# Patient Record
Sex: FEMALE | Race: BLACK OR AFRICAN AMERICAN | Employment: FULL TIME | URBAN - METROPOLITAN AREA
[De-identification: names, ages, dates, MRNs, and addresses within clinical notes are randomized per-mention and may not be internally consistent; named-entity substitution may affect disease eponyms.]

---

## 2017-01-13 RX ORDER — ANASTROZOLE 1 MG/1
TABLET ORAL
Qty: 90 TAB | Refills: 0 | Status: SHIPPED | OUTPATIENT
Start: 2017-01-13 | End: 2017-03-24 | Stop reason: SDUPTHER

## 2017-05-31 ENCOUNTER — OFFICE VISIT (OUTPATIENT)
Dept: ONCOLOGY | Age: 57
End: 2017-05-31

## 2017-05-31 VITALS
HEIGHT: 65 IN | HEART RATE: 56 BPM | OXYGEN SATURATION: 99 % | RESPIRATION RATE: 20 BRPM | BODY MASS INDEX: 30.49 KG/M2 | DIASTOLIC BLOOD PRESSURE: 86 MMHG | TEMPERATURE: 96.8 F | WEIGHT: 183 LBS | SYSTOLIC BLOOD PRESSURE: 140 MMHG

## 2017-05-31 DIAGNOSIS — C50.911 MALIGNANT NEOPLASM OF RIGHT FEMALE BREAST, UNSPECIFIED SITE OF BREAST: Primary | ICD-10-CM

## 2017-05-31 NOTE — PROGRESS NOTES
[address], [address] [facility] [[email or web address]]  David Viveros 19  W: [phone]  F: [phone]     f/u HEME/ONC CONSULT    Reason for visit: evaluation for treatment for   Breast Cancer    Consulting physician:  Dr. Say South    HPI:   Luiza Martínez is a 64 y.o.  female who I was asked to see in consultation at the request of Dr. Ness Lindsey for evaluation for systemic therapy for breast cancer. She had an abnormal mammogram (last mammogram prior to this year was in 2008) that led to a right breast biopsy on 7/29/14 showing ADH. Right lumpectomy on 8/20/14 showed 1 cm of IDC, gr 2, ER + at 100%, GA negative, HER 2 negative (ratio 1.2, sig/cell 3.2), ki67 10%, DCIS present without extensive intraductal component, 1.2 cm, ER + at 100%, GA negative. oncotype RS = 21, intermediate risk. ER + at 9.2; GA negative at 5.3; HER 2 negative at 8.7. SLN bx and ALND on 10/6/14 showed 1/12 nodes involved, 0.4 cm, no CHRIS. Extensive FH of breast cancer, MGM at age 67, mother diagnosed at age 72, 2 maternal aunts diagnosed with breast cancer at ages 67 and 76. BRCA 1/2/BARTnegative. Finished XRT 12/12/14    Started anastrozole 12/19/14    Interval history: In today for follow up. Complains of gr 2 constipation, gr 1 insomnia, gr 1 vaginal dryness. She continues anastrozole, tolerating well. DX   Encounter Diagnosis   Name Primary?     Malignant neoplasm of right female breast, unspecified site of breast ([address] Yes          Past Medical History:   Diagnosis Date    Anemia 10 yrs ago    Breast cancer ([address]     2014    Cancer ([address] 7/30/2014    Dental disorder periodontal disease    Radiation therapy complication     idc- pt opted no chemo 1+lymph node     Past Surgical History:   Procedure Laterality Date    BREAST SURGERY PROCEDURE UNLISTED Right     RIGHT sentinel node biopsy    HX BREAST BIOPSY Right 8/20/2014    RIGHT lumpectomy    HX BREAST LUMPECTOMY Right     2014-idc    HX  SECTION  1997    HX GI  2010     Social History     Social History    Marital status: SINGLE     Spouse name: N/A    Number of children: N/A    Years of education: N/A     Social History Main Topics    Smoking status: Never Smoker    Smokeless tobacco: Never Used    Alcohol use No    Drug use: No    Sexual activity: Yes     Partners: Male     Birth control/ protection: Condom     Other Topics Concern    None     Social History Narrative     Family History   Problem Relation Age of Onset    Lupus Sister     Hypertension Sister     Cancer Mother 72     breast    Stroke Mother     Breast Cancer Mother     Cancer Maternal Aunt 67     breast    Cancer Maternal Grandmother 72     breast    Cancer Maternal Aunt 75     breast       Current Outpatient Prescriptions   Medication Sig Dispense Refill    anastrozole (ARIMIDEX) 1 mg tablet TAKE 1 TABLET BY MOUTH EVERY DAY 90 Tab 3    cholecalciferol, vitamin D3, 400 unit/5 mL liqd Take 3 Drops by mouth daily. 2000 units/day         No Known Allergies    Review of Systems    A comprehensive review of systems was performed and all systems were negative except for HPI and for the the symptom report form, reviewed and scanned in.    Objective:  Physical Exam:  Visit Vitals    /86 (BP 1 Location: Right arm, BP Patient Position: Sitting)    Pulse (!) 56    Temp 96.8 °F (36 °C) (Temporal)    Resp 20    Ht 5' 5\" (1.651 m)    Wt 183 lb (83 kg)    SpO2 99%    BMI 30.45 kg/m2       General:  Alert, cooperative, no distress, appears stated age. Head:  Normocephalic, without obvious abnormality, atraumatic. Eyes:  Conjunctivae/corneas clear. PERRL, EOMs intact. Throat: Lips, mucosa, and tongue normal.    Neck: Supple, symmetrical, trachea midline, no adenopathy, thyroid: no enlargement/tenderness/nodules   Back:   Symmetric, no curvature. ROM normal. No CVA tenderness. Lungs:   Clear to auscultation bilaterally.    Chest wall:  No tenderness or deformity. Heart:  Regular rate and rhythm, S1, S2 normal, no murmur, click, rub or gallop. Abdomen:   Soft, non-tender. Bowel sounds normal. No masses,  No organomegaly. Extremities: Extremities normal, atraumatic, no cyanosis or edema. Skin: Skin color, texture, turgor normal. No rashes or lesions. Lymph nodes: Cervical, supraclavicular, and axillary nodes normal. Palpable cord in right axilla   Neurologic: CNII-XII intact. Diagnostic Imaging   No results found for this or any previous visit. 8/29/14 breast MRI  Right breast: A postoperative cavity appears in the right upper outer   quadrant anteriorly, containing a biopsy clip in its posterior lateral wall. The cavity itself measures 3.2 x 2.4 x 3.8 cm. There is no abnormal   associated skin or nipple thickening and no additional enhancement or   morphologic abnormality in the right breast concerning for malignancy. There   are benign appearing nonenhancing masses suggesting fibroadenomas and small   scattered foci showing delayed enhancement, most of which suggest   intramammary lymph nodes. There is no lymphadenopathy. Left breast: Scattered foci of delayed enhancement. No morphologic or   enhancement abnormality to suggest malignancy. No lymphadenopathy. IMPRESSION:   1. Right BI-RADS 6, known malignancy. Left BI-RADS 2, benign. 2. RIGHT BREAST: Postoperative changes status post surgical resection of   known malignancy. There is no MRI evidence for residual neoplasm in the   breast.   3. LEFT BREAST: No MRI sign of malignancy.     12/11/14 dexa  Findings:  Lumbar spine: L1-L4, excluding L2  Bone mineral density (gm/cm2): 1.170  % of peak bone mass: 112  % for age matched controls: 125  T score: 1.1  Z score: 2.2  Hip: Left total hip  Bone mineral density (gm/cm2): 1.090  % of peak bone mass: 116  % for age matched controls: 80  T score: 1.2  Z score: 1.9  Hip: Right total hip  Bone mineral density (gm/cm2): 1.052  % of peak bone mass: 112  % for age matched controls: 80  T score: 0.9  Z score: 1.5  IMPRESSION:  This patient is normal using the World Health Organization criteria  10 year probability of major osteoporotic fracture: 3.3%  10 year probability of hip fracture: 0%    4/23/15 UE Doppler  FINDINGS:  Right: The internal jugular, subclavian, axillary, brachial,  basilic, and cephalic veins are visualized; each is compressible and no narrowing of the flow channel on color Doppler imaging is observed. Phasic/pulsatile flow is observed in the subclavian vein. Left: The subclavian vein is patent on color Doppler imaging and phasic/pulsatile flow is observed. This side was not otherwise evaluated. IMPRESSION: No evidence of right upper extremity vein thrombosis. 8/13/15 Bilat Mammogram  FINDINGS: Bilateral digital diagnostic mammography was performed, and is interpreted in conjunction with a computer assisted detection (CAD) system. There are postoperative changes within the right breast. There is new diffuse right breast trabecular and skin thickening, likely reflecting radiation therapy. No dominant mass is visualized. No suspicious cluster of microcalcification is seen. Left breast parenchyma is stable.    1/21/16 bilat mammo  IMPRESSION:  1. Right BI-RADS 2, benign. LEFT BI-RADS 4, suspicious. 2. RIGHT BREAST: Postoperative changes in the upper outer quadrant    accompanied by new post radiation changes but no MRI sign of malignancy.    Followup diagnostic mammography is recommended in August 2016, followup    surveillance MRI January 2017. 3. LEFT BREAST: Clumped linear enhancement at 11:30, essentially new since    the prior study, concerning for possible DCIS. Diagnostic mammography with    breast MRI radiologist is recommended, with magnification views at 11:30 ,    and sonographic correlation if appropriate for further characterization and    possible biopsy localization.  If this is unsuccessful, MRI localization    could be attempted. 4. A summary portfolio has been created for reference and is available in    PACS. 3/10/16 MRI biopsy Left breast-benign      Lab Results  Lab Results   Component Value Date/Time    WBC 4.5 08/13/2014 02:52 PM    HGB 12.0 08/13/2014 02:52 PM    HCT 36.4 08/13/2014 02:52 PM    PLATELET 220 26/97/1527 02:52 PM    MCV 81.8 08/13/2014 02:52 PM     Lab Results   Component Value Date/Time    Sodium 141 08/13/2014 02:52 PM    Potassium 4.0 08/13/2014 02:52 PM    Chloride 101 08/13/2014 02:52 PM    CO2 30 08/13/2014 02:52 PM    Anion gap 10 08/13/2014 02:52 PM    Glucose 106 08/13/2014 02:52 PM    BUN 20 08/13/2014 02:52 PM    Creatinine 0.79 08/13/2014 02:52 PM    BUN/Creatinine ratio 25 08/13/2014 02:52 PM    GFR est AA >60 08/13/2014 02:52 PM    GFR est non-AA >60 08/13/2014 02:52 PM    Calcium 9.1 08/13/2014 02:52 PM     Assessment/Plan:  64 y.o. female with [id number], right IDC, gr 2, ER +, ID negative, HER 2 negative. Postmenopausal, LMP 9/2012. Intermediate oncotype. PS 0    1. Breast cancer stage: IIA    Hormonal therapy: administerd    With no further therapy, her RR is 36%, treatment with endocrine therapy will decrease her RR to 18%, the addition of chemo will decrease her RR to 10-12%. This is close to the RR given by the intermediate oncotype, though with that the exact benefit of chemo is an unknown. It was my recommendation due to her + LN and a NOT low oncotype, for her to receive chemo followed by XRT and endocrine therapy. Patient has declined chemotherapy. No evidence of recurrence, continue anastrozole. Mammogram on 1/21/16 showed suspicious lesion in left breast. MRI biopsy on 3/10/16 was benign. Repeat 3D mammogram in Oct 2016 was benign, next due 10/17, ordered. Baseline DEXA in 2014 normal. Repeat DEXA ordered, not yet scheduled. 2. Right breast lymphedema: resolved.  Lowered sodium intake    > 15 min were spent with this patient with > 50% of that time spent in face to face counseling      There are no Patient Instructions on file for this visit. Follow-up Disposition:  Return for 6m fu, claudia.     Carol Vásquez MD

## 2017-05-31 NOTE — MR AVS SNAPSHOT
Visit Information Date & Time Provider Department Dept. Phone Encounter #  
 5/31/2017  8:00 AM MD Elizabeth Cedilloavebess Oncology at [facility] [phone] Follow-up Instructions Return for 6m claudia noriega. Your Appointments 11/29/2017  8:00 AM  
ESTABLISHED PATIENT with MD Jacqueline Cedillo Oncology at [facility]) Appt Note: 6 mo fu, [place] [address] [facility] [phone] [id number]  
[phone]  
  
   
 [address] [facility] Upcoming Health Maintenance Date Due Hepatitis C Screening 1960 Pneumococcal 19-64 Highest Risk (1 of 3 - PCV13) 6/7/1979 DTaP/Tdap/Td series (1 - Tdap) 6/7/1981 PAP AKA CERVICAL CYTOLOGY 6/7/1981 FOBT Q 1 YEAR AGE 50-75 6/7/2010 INFLUENZA AGE 9 TO ADULT 8/1/2017 BREAST CANCER SCRN MAMMOGRAM 10/13/2018 Allergies as of 5/31/2017  Review Complete On: 5/31/2017 By: Laurence Hickey NP No Known Allergies Current Immunizations  Reviewed on 7/23/2015 No immunizations on file. Not reviewed this visit You Were Diagnosed With   
  
 Codes Comments Malignant neoplasm of right female breast, unspecified site of breast ([facility]    -  Primary ICD-10-CM: C50.911 ICD-9-CM: 174.9 Vitals BP Pulse Temp Resp Height(growth percentile) Weight(growth percentile) 140/86 (BP 1 Location: Right arm, BP Patient Position: Sitting) (!) 56 96.8 °F (36 °C) (Temporal) 20 5' 5\" (1.651 m) 183 lb (83 kg) SpO2 BMI OB Status Smoking Status 99% 30.45 kg/m2 Postmenopausal Never Smoker BMI and BSA Data Body Mass Index Body Surface Area  
 30.45 kg/m 2 1.95 m 2 Preferred Pharmacy Pharmacy Name Phone [facility] AT [address] [place] [address] AT [address] [place] [address] [phone] Your Updated Medication List  
 This list is accurate as of: 5/31/17  8:39 AM.  Always use your most recent med list.  
  
  
  
  
 anastrozole 1 mg tablet Commonly known as:  ARIMIDEX TAKE 1 TABLET BY MOUTH EVERY DAY  
  
 cholecalciferol (vitamin D3) 400 unit/5 mL Liqd Take 3 Drops by mouth daily. 2000 units/day Follow-up Instructions Return for 6m hari, claudia. To-Do List   
 10/30/2017 Imaging:  [facility] 3D NANCY W MAMMO BI DX INCL CAD Introducing \[facility]\"" & [facility] SERVICES! Dear Rae Garcia: 
Thank you for requesting a Service[employer] account. Our records indicate that you already have an active ServiceFrame account. You can access your account anytime at [email or web address] Did you know that you can access your hospital and ER discharge instructions at any time in ServiceFrame? You can also review all of your test results from your hospital stay or ER visit. Additional Information If you have questions, please visit the Frequently Asked Questions section of the ServiceFrame website at [email or web address] Remember, ServiceFrame is NOT to be used for urgent needs. For medical emergencies, dial 911. Now available from your iPhone and Android! Please provide this summary of care documentation to your next provider. Your primary care clinician is listed as Shae Mayberry. If you have any questions after today's visit, please call [phone].

## 2017-07-13 ENCOUNTER — OFFICE VISIT (OUTPATIENT)
Dept: SURGERY | Age: 57
End: 2017-07-13

## 2017-07-13 VITALS
HEART RATE: 75 BPM | HEIGHT: 65 IN | SYSTOLIC BLOOD PRESSURE: 116 MMHG | BODY MASS INDEX: 30.32 KG/M2 | WEIGHT: 182 LBS | DIASTOLIC BLOOD PRESSURE: 71 MMHG

## 2017-07-13 DIAGNOSIS — N64.4 MASTODYNIA OF RIGHT BREAST: Primary | ICD-10-CM

## 2017-07-13 NOTE — PATIENT INSTRUCTIONS
Breast Self-Exam: Care Instructions  Your Care Instructions  A breast self-exam is when you check your breasts for lumps or changes. This regular exam helps you learn how your breasts normally look and feel. Most breast problems or changes are not because of cancer. Breast self-exam is not a substitute for a mammogram. Having regular breast exams by your doctor and regular mammograms improve your chances of finding any problems with your breasts. Some women set a time each month to do a step-by-step breast self-exam. Other women like a less formal system. They might look at their breasts as they brush their teeth, or feel their breasts once in a while in the shower. If you notice a change in your breast, tell your doctor. Follow-up care is a key part of your treatment and safety. Be sure to make and go to all appointments, and call your doctor if you are having problems. Its also a good idea to know your test results and keep a list of the medicines you take. How do you do a breast self-exam?  · The best time to examine your breasts is usually one week after your menstrual period begins. Your breasts should not be tender then. If you do not have periods, you might do your exam on a day of the month that is easy to remember. · To examine your breasts:  ¨ Remove all your clothes above the waist and lie down. When you are lying down, your breast tissue spreads evenly over your chest wall, which makes it easier to feel all your breast tissue. ¨ Use the pads--not the fingertips--of the 3 middle fingers of your left hand to check your right breast. Move your fingers slowly in small coin-sized circles that overlap. ¨ Use three levels of pressure to feel of all your breast tissue. Use light pressure to feel the tissue close to the skin surface. Use medium pressure to feel a little deeper. Use firm pressure to feel your tissue close to your breastbone and ribs.  Use each pressure level to feel your breast tissue before moving on to the next spot. ¨ Check your entire breast, moving up and down as if following a strip from the collarbone to the bra line, and from the armpit to the ribs. Repeat until you have covered the entire breast.  ¨ Repeat this procedure for your left breast, using the pads of the 3 middle fingers of your right hand. · To examine your breasts while in the shower:  ¨ Place one arm over your head and lightly soap your breast on that side. ¨ Using the pads of your fingers, gently move your hand over your breast (in the strip pattern described above), feeling carefully for any lumps or changes. ¨ Repeat for the other breast.  · Have your doctor inspect anything you notice to see if you need further testing. Where can you learn more? Go to http://isidra-jasen.info/. Enter P148 in the search box to learn more about \"Breast Self-Exam: Care Instructions. \"  Current as of: July 26, 2016  Content Version: 11.3  © 6624-9117 [employer]. Care instructions adapted under license by [employer] (which disclaims liability or warranty for this information). If you have questions about a medical condition or this instruction, always ask your healthcare professional. [place] [address] any warranty or liability for your use of this information.

## 2017-07-13 NOTE — PROGRESS NOTES
HISTORY OF PRESENT ILLNESS  Coy Kwon is a 62 y.o. female. HPI   ESTABLISHED patient here today for RIGHT breast pain and history of RIGHT breast cancer. She has intermittent pain for 4 weeks. She noticed this after starting to exercise. She describes it as a nagging pain. Denies any breast lumps, nipple discharge/retraction or skin changes. Currently taking Arimidex and tolerating well.     8/2014 RIGHT lumpectomy 1cm Infiltrating ductal carcinoma, grade 2, +1/9 nodes, %. MO neg, Ki 10%, Her 2 neg  BRCA neg  oncotype intermediate risk (no chemo)  Ext beam radiation, anastrazole    3/2016- LEFT MRI guided biopsy- benign     Recent imaging-  10/13/16- 3D bilateral diagnostic mammogram- BIRADS 2  FINDINGS:   Lumpectomy changes in the right breast are stable. The benign-appearing mass in  the right breast, characterized as a fibroadenoma on prior MRI from 2014, is  stable. Benign-appearing calcifications compatible with fat necrosis in the  right breast are seen. No suspicious masses or microcalcifications are  identified. IMPRESSION:  1. BI-RADS category 2, benign. 2. The patient should return in one year for diagnostic bilateral mammography. 3. She was informed. ROS    Physical Exam   Pulmonary/Chest: Right breast exhibits tenderness (intermittent tenderness medial breast). Right breast exhibits no inverted nipple, no mass, no nipple discharge and no skin change. Left breast exhibits no inverted nipple, no mass, no nipple discharge, no skin change and no tenderness. Breasts are symmetrical.       Lymphadenopathy:     She has no cervical adenopathy. She has no axillary adenopathy. Right: No supraclavicular adenopathy present. Left: No supraclavicular adenopathy present. BREAST ULTRASOUND  Indication: Right  breast pain medial breast  Technique: The area was scanned using a high-frequency linear-array near-field transducer  Findings: No abnormal mass, lesion, or shadowing noted. No cysts  Impression: Normal breast tissue   Disposition: No worrisome finding on ultrasound  ASSESSMENT and PLAN    ICD-10-CM ICD-9-CM    1. Mastodynia of right breast N64.4 611.71       1  Breast pain is musculoskeletal  No worrisome finding on physical exam or ultraosund  2. Pt does not want to continue screening MRI. 3. Yearly 3d mammogram  (lost 20lbs.   24 hr fast once a week and doesn't eat after 6 pm)

## 2017-07-13 NOTE — COMMUNICATION BODY
HISTORY OF PRESENT ILLNESS  Daysi Zuñiga is a 62 y.o. female. HPI   ESTABLISHED patient here today for RIGHT breast pain and history of RIGHT breast cancer. She has intermittent pain for 4 weeks. She noticed this after starting to exercise. She describes it as a nagging pain. Denies any breast lumps, nipple discharge/retraction or skin changes. Currently taking Arimidex and tolerating well.     8/2014 RIGHT lumpectomy 1cm Infiltrating ductal carcinoma, grade 2, +1/9 nodes, %. AK neg, Ki 10%, Her 2 neg  BRCA neg  oncotype intermediate risk (no chemo)  Ext beam radiation, anastrazole    3/2016- LEFT MRI guided biopsy- benign     Recent imaging-  10/13/16- 3D bilateral diagnostic mammogram- BIRADS 2  FINDINGS:   Lumpectomy changes in the right breast are stable. The benign-appearing mass in  the right breast, characterized as a fibroadenoma on prior MRI from 2014, is  stable. Benign-appearing calcifications compatible with fat necrosis in the  right breast are seen. No suspicious masses or microcalcifications are  identified. IMPRESSION:  1. BI-RADS category 2, benign. 2. The patient should return in one year for diagnostic bilateral mammography. 3. She was informed. ROS    Physical Exam   Pulmonary/Chest: Right breast exhibits tenderness (intermittent tenderness medial breast). Right breast exhibits no inverted nipple, no mass, no nipple discharge and no skin change. Left breast exhibits no inverted nipple, no mass, no nipple discharge, no skin change and no tenderness. Breasts are symmetrical.       Lymphadenopathy:     She has no cervical adenopathy. She has no axillary adenopathy. Right: No supraclavicular adenopathy present. Left: No supraclavicular adenopathy present. BREAST ULTRASOUND  Indication: Right  breast pain medial breast  Technique: The area was scanned using a high-frequency linear-array near-field transducer  Findings: No abnormal mass, lesion, or shadowing noted. No cysts  Impression: Normal breast tissue   Disposition: No worrisome finding on ultrasound  ASSESSMENT and PLAN    ICD-10-CM ICD-9-CM    1. Mastodynia of right breast N64.4 611.71       1  Breast pain is musculoskeletal  No worrisome finding on physical exam or ultraosund  2. Pt does not want to continue screening MRI. 3. Yearly 3d mammogram  (lost 20lbs.   24 hr fast once a week and doesn't eat after 6 pm)

## 2017-07-13 NOTE — MR AVS SNAPSHOT
Visit Information Date & Time Provider Department Dept. Phone Encounter #  
 7/13/2017  2:30 PM Flakita Hagan MD [place] [phone] [id number] Your Appointments 11/29/2017  8:00 AM  
ESTABLISHED PATIENT with Pura Mota MD  
[facility] at [facility] [address]) Appt Note: 6 mo fu, Ana Rosa Lipoma [address] [facility], [address] Critical access hospital [phone]  
[phone]  
  
   
 [address] [facility], [address] 100[address] [facility] Upcoming Health Maintenance Date Due Hepatitis C Screening 1960 Pneumococcal 19-64 Highest Risk (1 of 3 - PCV13) 6/7/1979 DTaP/Tdap/Td series (1 - Tdap) 6/7/1981 PAP AKA CERVICAL CYTOLOGY 6/7/1981 FOBT Q 1 YEAR AGE 50-75 6/7/2010 INFLUENZA AGE 9 TO ADULT 8/1/2017 BREAST CANCER SCRN MAMMOGRAM 10/13/2018 Allergies as of 7/13/2017  Review Complete On: 7/13/2017 By: Flakita Hagan MD  
 No Known Allergies Current Immunizations  Reviewed on 7/23/2015 No immunizations on file. Not reviewed this visit Vitals BP Pulse Height(growth percentile) Weight(growth percentile) BMI OB Status 116/71 75 5' 5\" (1.651 m) 182 lb (82.6 kg) 30.29 kg/m2 Postmenopausal  
 Smoking Status Never Smoker BMI and BSA Data Body Mass Index Body Surface Area  
 30.29 kg/m 2 1.95 m 2 Preferred Pharmacy Pharmacy Name Phone [facility] AT [address] [place] [address] AT [address] [place] [address] Rd [phone] Your Updated Medication List  
  
   
This list is accurate as of: 7/13/17  4:14 PM.  Always use your most recent med list.  
  
  
  
  
 anastrozole 1 mg tablet Commonly known as:  ARIMIDEX TAKE 1 TABLET BY MOUTH EVERY DAY  
  
 cholecalciferol (vitamin D3) 400 unit/5 mL Liqd Take 3 Drops by mouth daily. 2000 units/day To-Do List   
 10/18/2017 1:30 PM  
 (Arrive by 1:15 PM) Appointment with [facility] at [facility] ([phone]) Shower or bathe using soap and water. Do not use deodorant, powder, perfumes, or lotion the day of your exam.  If your prior mammograms were not performed at [facility] 6 please bring films with you or forward prior images 2 days before your procedure. Check in at registration 15min before your appointment time unless you were instructed to do otherwise. A script is not necessary for screening. If you have one, please bring it on the day of the mammogram or have it faxed to the department. [facility]  [phone] [facility] [phone] \[facility] [place] [facility]\"" [phone] [facility] [phone] Please arrive 15 minutes prior to appointment to register Patient Instructions Breast Self-Exam: Care Instructions Your Care Instructions A breast self-exam is when you check your breasts for lumps or changes. This regular exam helps you learn how your breasts normally look and feel. Most breast problems or changes are not because of cancer. Breast self-exam is not a substitute for a mammogram. Having regular breast exams by your doctor and regular mammograms improve your chances of finding any problems with your breasts. Some women set a time each month to do a step-by-step breast self-exam. Other women like a less formal system. They might look at their breasts as they brush their teeth, or feel their breasts once in a while in the shower. If you notice a change in your breast, tell your doctor. Follow-up care is a key part of your treatment and safety. Be sure to make and go to all appointments, and call your doctor if you are having problems. Its also a good idea to know your test results and keep a list of the medicines you take. How do you do a breast self-exam? 
· The best time to examine your breasts is usually one week after your menstrual period begins. Your breasts should not be tender then.  If you do not have periods, you might do your exam on a day of the month that is easy to remember. · To examine your breasts: ¨ Remove all your clothes above the waist and lie down. When you are lying down, your breast tissue spreads evenly over your chest wall, which makes it easier to feel all your breast tissue. ¨ Use the padsnot the fingertipsof the 3 middle fingers of your left hand to check your right breast. Move your fingers slowly in small coin-sized circles that overlap. ¨ Use three levels of pressure to feel of all your breast tissue. Use light pressure to feel the tissue close to the skin surface. Use medium pressure to feel a little deeper. Use firm pressure to feel your tissue close to your breastbone and ribs. Use each pressure level to feel your breast tissue before moving on to the next spot. ¨ Check your entire breast, moving up and down as if following a strip from the collarbone to the bra line, and from the armpit to the ribs. Repeat until you have covered the entire breast. 
¨ Repeat this procedure for your left breast, using the pads of the 3 middle fingers of your right hand. · To examine your breasts while in the shower: 
¨ Place one arm over your head and lightly soap your breast on that side. ¨ Using the pads of your fingers, gently move your hand over your breast (in the strip pattern described above), feeling carefully for any lumps or changes. ¨ Repeat for the other breast. 
· Have your doctor inspect anything you notice to see if you need further testing. Where can you learn more? Go to http://isidra-jasen.info/. Enter P148 in the search box to learn more about \"Breast Self-Exam: Care Instructions. \" Current as of: July 26, 2016 Content Version: 11.3 © 6421-0028 [employer].  Care instructions adapted under license by [employer] (which disclaims liability or warranty for this information). If you have questions about a medical condition or this instruction, always ask your healthcare professional. [address] any warranty or liability for your use of this information. Introducing [facility] SERVICES! Dear Fara Beauchamp: 
Thank you for requesting a [employer] account. Our records indicate that you already have an active [employer] account. You can access your account anytime at [email or web address] Did you know that you can access your hospital and ER discharge instructions at any time in [employer]? You can also review all of your test results from your hospital stay or ER visit. Additional Information If you have questions, please visit the Frequently Asked Questions section of the [employer] website at [email or web address] Remember, [employer] is NOT to be used for urgent needs. For medical emergencies, dial 911. Now available from your iPhone and Android! Please provide this summary of care documentation to your next provider. Your primary care clinician is listed as Moreno Peralta. If you have any questions after today's visit, please call [phone].

## 2017-11-07 ENCOUNTER — HOSPITAL ENCOUNTER (OUTPATIENT)
Dept: MAMMOGRAPHY | Age: 57
Discharge: HOME | End: 2017-11-07
Attending: NURSE PRACTITIONER
Payer: COMMERCIAL

## 2017-11-07 DIAGNOSIS — C50.911 MALIGNANT NEOPLASM OF RIGHT FEMALE BREAST (HCC): ICD-10-CM

## 2017-11-07 PROCEDURE — 77062 BREAST TOMOSYNTHESIS BI: CPT

## 2017-11-29 ENCOUNTER — OFFICE VISIT (OUTPATIENT)
Dept: ONCOLOGY | Age: 57
End: 2017-11-29

## 2017-11-29 VITALS
WEIGHT: 187.6 LBS | BODY MASS INDEX: 31.25 KG/M2 | DIASTOLIC BLOOD PRESSURE: 79 MMHG | OXYGEN SATURATION: 98 % | HEIGHT: 65 IN | SYSTOLIC BLOOD PRESSURE: 126 MMHG | HEART RATE: 61 BPM | TEMPERATURE: 97.9 F | RESPIRATION RATE: 18 BRPM

## 2017-11-29 DIAGNOSIS — C50.911 MALIGNANT NEOPLASM OF RIGHT BREAST IN FEMALE, ESTROGEN RECEPTOR POSITIVE, UNSPECIFIED SITE OF BREAST (HCC): Primary | ICD-10-CM

## 2017-11-29 DIAGNOSIS — Z17.0 MALIGNANT NEOPLASM OF RIGHT BREAST IN FEMALE, ESTROGEN RECEPTOR POSITIVE, UNSPECIFIED SITE OF BREAST (HCC): Primary | ICD-10-CM

## 2017-11-29 NOTE — PROGRESS NOTES
[address], [address] [facility]  [address]  W: [phone]  F: [phone]     f/u HEME/ONC CONSULT    Reason for visit: evaluation for treatment for   Breast Cancer    Consulting physician:  Dr. Jesika Bui    HPI:   Reddy Jenkins is a 62 y.o.  female who I was asked to see in consultation at the request of Dr. Andrei Kenny for evaluation for systemic therapy for breast cancer. She had an abnormal mammogram (last mammogram prior to this year was in 2008) that led to a right breast biopsy on 7/29/14 showing ADH. 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 oncotype RS = 21, intermediate risk. ER + at 9.2; NE negative at 5.3; HER 2 negative at 8.7. SLN bx and ALND on 10/6/14 showed 1/12 nodes involved, 0.4 cm, no CHRIS. Extensive FH of breast cancer, MGM at age 67, mother diagnosed at age 72, 2 maternal aunts diagnosed with breast cancer at ages 67 and 76. BRCA 1/2/BARTnegative. Finished XRT 12/12/14    Started anastrozole 12/19/14    Interval history: In today for follow up. Complains of gr 1 constipation, gr 1 insomnia, she continues anastrozole, tolerating well. DX   Encounter Diagnosis   Name Primary?     Malignant neoplasm of right breast in female, estrogen receptor positive, unspecified site of breast ([address] Yes          Past Medical History:   Diagnosis Date    Anemia 10 yrs ago    Breast cancer ([address]     2014    Cancer ([address] 7/30/2014    Dental disorder periodontal disease    Radiation therapy complication     idc- pt opted no chemo 1+lymph node     Past Surgical History:   Procedure Laterality Date    BREAST SURGERY PROCEDURE UNLISTED Right     RIGHT sentinel node biopsy    HX BREAST BIOPSY Right 8/20/2014    RIGHT lumpectomy    HX BREAST LUMPECTOMY Right 2014-idc    HX  SECTION  1997    HX GI  2010     Social History     Social History    Marital status: SINGLE     Spouse name: N/A    Number of children: N/A    Years of education: N/A     Social History Main Topics    Smoking status: Never Smoker    Smokeless tobacco: Never Used    Alcohol use No    Drug use: No    Sexual activity: Yes     Partners: Male     Birth control/ protection: Condom     Other Topics Concern    None     Social History Narrative     Family History   Problem Relation Age of Onset    Lupus Sister     Hypertension Sister     Cancer Mother 72     breast    Stroke Mother     Breast Cancer Mother     Cancer Maternal Aunt 67     breast    Cancer Maternal Grandmother 72     breast    Cancer Maternal Aunt 75     breast       Current Outpatient Prescriptions   Medication Sig Dispense Refill    anastrozole (ARIMIDEX) 1 mg tablet TAKE 1 TABLET BY MOUTH EVERY DAY 90 Tab 3    cholecalciferol, vitamin D3, 400 unit/5 mL liqd Take 3 Drops by mouth daily. 2000 units/day         No Known Allergies    Review of Systems    A comprehensive review of systems was performed and all systems were negative except for HPI and for the the symptom report form, reviewed and scanned in.    Objective:  Physical Exam:  Visit Vitals    /79    Pulse 61    Temp 97.9 °F (36.6 °C) (Temporal)    Resp 18    Ht 5' 5\" (1.651 m)    Wt 187 lb 9.6 oz (85.1 kg)    SpO2 98%    BMI 31.22 kg/m2       General:  Alert, cooperative, no distress, appears stated age. Head:  Normocephalic, without obvious abnormality, atraumatic. Eyes:  Conjunctivae/corneas clear. PERRL, EOMs intact. Throat: Lips, mucosa, and tongue normal.    Neck: Supple, symmetrical, trachea midline, no adenopathy, thyroid: no enlargement/tenderness/nodules   Back:   Symmetric, no curvature. ROM normal. No CVA tenderness. Lungs:   Clear to auscultation bilaterally. Chest wall:  No tenderness or deformity.    Heart:  Regular rate and rhythm, S1, S2 normal, no murmur, click, rub or gallop. Abdomen:   Soft, non-tender. Bowel sounds normal. No masses,  No organomegaly. Extremities: Extremities normal, atraumatic, no cyanosis or edema. Skin: Skin color, texture, turgor normal. No rashes or lesions. Lymph nodes: Cervical, supraclavicular, and axillary nodes normal. Palpable cord in right axilla   Neurologic: CNII-XII intact. Diagnostic Imaging   No results found for this or any previous visit. 8/29/14 breast MRI  Right breast: A postoperative cavity appears in the right upper outer   quadrant anteriorly, containing a biopsy clip in its posterior lateral wall. The cavity itself measures 3.2 x 2.4 x 3.8 cm. There is no abnormal   associated skin or nipple thickening and no additional enhancement or   morphologic abnormality in the right breast concerning for malignancy. There   are benign appearing nonenhancing masses suggesting fibroadenomas and small   scattered foci showing delayed enhancement, most of which suggest   intramammary lymph nodes. There is no lymphadenopathy. Left breast: Scattered foci of delayed enhancement. No morphologic or   enhancement abnormality to suggest malignancy. No lymphadenopathy. IMPRESSION:   1. Right BI-RADS 6, known malignancy. Left BI-RADS 2, benign. 2. RIGHT BREAST: Postoperative changes status post surgical resection of   known malignancy. There is no MRI evidence for residual neoplasm in the   breast.   3. LEFT BREAST: No MRI sign of malignancy.     12/11/14 dexa  Findings:  Lumbar spine: L1-L4, excluding L2  Bone mineral density (gm/cm2): 1.170  % of peak bone mass: 112  % for age matched controls: 125  T score: 1.1  Z score: 2.2  Hip: Left total hip  Bone mineral density (gm/cm2): 1.090  % of peak bone mass: 116  % for age matched controls: 80  T score: 1.2  Z score: 1.9  Hip: Right total hip  Bone mineral density (gm/cm2): 1.052  % of peak bone mass: 112  % for age matched controls: 122  T score: 0.9  Z score: 1.5  IMPRESSION:  This patient is normal using the World Health Organization criteria  10 year probability of major osteoporotic fracture: 3.3%  10 year probability of hip fracture: 0%    4/23/15 UE Doppler  FINDINGS:  Right: The internal jugular, subclavian, axillary, brachial,  basilic, and cephalic veins are visualized; each is compressible and no narrowing of the flow channel on color Doppler imaging is observed. Phasic/pulsatile flow is observed in the subclavian vein. Left: The subclavian vein is patent on color Doppler imaging and phasic/pulsatile flow is observed. This side was not otherwise evaluated. IMPRESSION: No evidence of right upper extremity vein thrombosis. 8/13/15 Bilat Mammogram  FINDINGS: Bilateral digital diagnostic mammography was performed, and is interpreted in conjunction with a computer assisted detection (CAD) system. There are postoperative changes within the right breast. There is new diffuse right breast trabecular and skin thickening, likely reflecting radiation therapy. No dominant mass is visualized. No suspicious cluster of microcalcification is seen. Left breast parenchyma is stable.    1/21/16 bilat mammo  IMPRESSION:  1. Right BI-RADS 2, benign. LEFT BI-RADS 4, suspicious. 2. RIGHT BREAST: Postoperative changes in the upper outer quadrant    accompanied by new post radiation changes but no MRI sign of malignancy.    Followup diagnostic mammography is recommended in August 2016, followup    surveillance MRI January 2017. 3. LEFT BREAST: Clumped linear enhancement at 11:30, essentially new since    the prior study, concerning for possible DCIS. Diagnostic mammography with    breast MRI radiologist is recommended, with magnification views at 11:30 ,    and sonographic correlation if appropriate for further characterization and    possible biopsy localization. If this is unsuccessful, MRI localization    could be attempted.   4. A summary portfolio has been created for reference and is available in    PACS. 3/10/16 MRI biopsy Left breast-benign    11/7/17 3D mammogram  benign      Lab Results  Lab Results   Component Value Date/Time    WBC 4.5 08/13/2014 02:52 PM    HGB 12.0 08/13/2014 02:52 PM    HCT 36.4 08/13/2014 02:52 PM    PLATELET 476 75/35/5959 02:52 PM    MCV 81.8 08/13/2014 02:52 PM     Lab Results   Component Value Date/Time    Sodium 141 08/13/2014 02:52 PM    Potassium 4.0 08/13/2014 02:52 PM    Chloride 101 08/13/2014 02:52 PM    CO2 30 08/13/2014 02:52 PM    Anion gap 10 08/13/2014 02:52 PM    Glucose 106 08/13/2014 02:52 PM    BUN 20 08/13/2014 02:52 PM    Creatinine 0.79 08/13/2014 02:52 PM    BUN/Creatinine ratio 25 08/13/2014 02:52 PM    GFR est AA >60 08/13/2014 02:52 PM    GFR est non-AA >60 08/13/2014 02:52 PM    Calcium 9.1 08/13/2014 02:52 PM     Assessment/Plan:  62 y.o. female with [id number], right IDC, gr 2, ER +, NC negative, HER 2 negative. Postmenopausal, LMP 9/2012. Intermediate oncotype. PS 0    1. Breast cancer stage: IIA    Hormonal therapy: administerd    With no further therapy, her RR is 36%, treatment with endocrine therapy will decrease her RR to 18%, the addition of chemo will decrease her RR to 10-12%. This is close to the RR given by the intermediate oncotype, though with that the exact benefit of chemo is an unknown. It was my recommendation due to her + LN and a NOT low oncotype, for her to receive chemo followed by XRT and endocrine therapy. Patient has declined chemotherapy. No evidence of recurrence, continue anastrozole. 3D mammogram due in Nov 2018    Baseline DEXA in 2014 normal. Repeat DEXA ordered, not yet scheduled. Reminded her again today. 2. Right breast lymphedema: resolved. Lowered sodium intake    > 15 min were spent with this patient with > 50% of that time spent in face to face counseling      There are no Patient Instructions on file for this visit. Follow-up Disposition:  Return in about 7 months (around 6/29/2018).     Gokul Colon MD

## 2018-07-09 RX ORDER — ANASTROZOLE 1 MG/1
TABLET ORAL
Qty: 30 TAB | Refills: 0 | Status: SHIPPED | OUTPATIENT
Start: 2018-07-09 | End: 2018-10-08 | Stop reason: SDUPTHER

## 2018-10-08 ENCOUNTER — OFFICE VISIT (OUTPATIENT)
Dept: ONCOLOGY | Age: 58
End: 2018-10-08

## 2018-10-08 VITALS
OXYGEN SATURATION: 98 % | HEART RATE: 60 BPM | WEIGHT: 193.2 LBS | BODY MASS INDEX: 32.19 KG/M2 | SYSTOLIC BLOOD PRESSURE: 135 MMHG | TEMPERATURE: 96.4 F | DIASTOLIC BLOOD PRESSURE: 91 MMHG | HEIGHT: 65 IN

## 2018-10-08 DIAGNOSIS — I89.0 LYMPHEDEMA: ICD-10-CM

## 2018-10-08 DIAGNOSIS — Z17.0 MALIGNANT NEOPLASM OF RIGHT BREAST IN FEMALE, ESTROGEN RECEPTOR POSITIVE, UNSPECIFIED SITE OF BREAST (HCC): Primary | ICD-10-CM

## 2018-10-08 DIAGNOSIS — C50.911 MALIGNANT NEOPLASM OF RIGHT BREAST IN FEMALE, ESTROGEN RECEPTOR POSITIVE, UNSPECIFIED SITE OF BREAST (HCC): Primary | ICD-10-CM

## 2018-10-08 DIAGNOSIS — Z78.0 POST-MENOPAUSAL: ICD-10-CM

## 2018-10-08 RX ORDER — ANASTROZOLE 1 MG/1
TABLET ORAL
Qty: 90 TAB | Refills: 3 | Status: SHIPPED | OUTPATIENT
Start: 2018-10-08

## 2018-10-08 NOTE — PROGRESS NOTES
[employer]  [address] [place] [address], [address] [place] [address]  David Viveros 19  W: [phone]  F: [phone]     f/u HEME/ONC CONSULT    Reason for visit: evaluation for treatment for   Breast Cancer    Consulting physician:  Dr. Wiley Brumfield    HPI:   Jose Maria Gallardo is a 62 y.o.  female who I was asked to see in consultation at the request of Dr. Gerson Reno for evaluation for systemic therapy for breast cancer. She had an abnormal mammogram (last mammogram prior to this year was in 2008) that led to a right breast biopsy on 7/29/14 showing ADH. Right lumpectomy on 8/20/14 showed 1 cm of IDC, gr 2, ER + at 100%, CO negative, HER 2 negative (ratio 1.2, sig/cell 3.2), ki67 10%, DCIS present without extensive intraductal component, 1.2 cm, ER + at 100%, CO negative. oncotype RS = 21, intermediate risk. ER + at 9.2; CO negative at 5.3; HER 2 negative at 8.7. SLN bx and ALND on 10/6/14 showed 1/12 nodes involved, 0.4 cm, no CHRIS. Extensive FH of breast cancer, MGM at age 67, mother diagnosed at age 72, 2 maternal aunts diagnosed with breast cancer at ages 67 and 76. BRCA 1/2/BARTnegative. Finished XRT 12/12/14    Started anastrozole 12/19/14    Interval history: In today for follow up. Complains of gr 2 constipation, gr 1 fatigue, gr 1 insomnia, gr 1 swelling. Taking anastrozole every day. DX   Encounter Diagnoses   Name Primary?     Malignant neoplasm of right breast in female, estrogen receptor positive, unspecified site of breast ([address] Yes    Post-menopausal     Lymphedema           Past Medical History:   Diagnosis Date    Anemia 10 yrs ago    Breast cancer ([address]     2014    Cancer ([address] 7/30/2014    Dental disorder periodontal disease    Radiation therapy complication     idc- pt opted no chemo 1+lymph node     Past Surgical History:   Procedure Laterality Date    BREAST SURGERY PROCEDURE UNLISTED Right     RIGHT sentinel node biopsy    HX BREAST BIOPSY Right 8/20/2014    RIGHT lumpectomy    HX BREAST LUMPECTOMY Right     2014-idc    HX  SECTION  1997    HX GI  2010     Social History     Social History    Marital status: SINGLE     Spouse name: N/A    Number of children: N/A    Years of education: N/A     Social History Main Topics    Smoking status: Never Smoker    Smokeless tobacco: Never Used    Alcohol use No    Drug use: No    Sexual activity: Yes     Partners: Male     Birth control/ protection: Condom     Other Topics Concern    None     Social History Narrative     Family History   Problem Relation Age of Onset    Lupus Sister     Hypertension Sister     Cancer Mother 72     breast    Stroke Mother     Breast Cancer Mother     Cancer Maternal Aunt 67     breast    Cancer Maternal Grandmother 67     breast    Cancer Maternal Aunt 75     breast       Current Outpatient Prescriptions   Medication Sig Dispense Refill    anastrozole (ARIMIDEX) 1 mg tablet TAKE 1 TABLET BY MOUTH EVERY DAY 30 Tab 0    cholecalciferol, vitamin D3, 400 unit/5 mL liqd Take 3 Drops by mouth daily. 2000 units/day         No Known Allergies    Review of Systems    A comprehensive review of systems was performed and all systems were negative except for HPI and for the the symptom report form, reviewed and scanned in.    Objective:  Physical Exam:  Visit Vitals    BP (!) 135/91 (BP 1 Location: Left arm, BP Patient Position: Sitting)  Comment: .  Pulse 60    Temp 96.4 °F (35.8 °C) (Oral)    Ht 5' 5\" (1.651 m)    Wt 193 lb 3.2 oz (87.6 kg)    LMP  (Exact Date)    SpO2 98%    BMI 32.15 kg/m2       General:  Alert, cooperative, no distress, appears stated age. Head:  Normocephalic, without obvious abnormality, atraumatic. Eyes:  Conjunctivae/corneas clear. PERRL, EOMs intact. Throat: Lips, mucosa, and tongue normal.    Neck: Supple, symmetrical, trachea midline, no adenopathy, thyroid: no enlargement/tenderness/nodules   Back:   Symmetric, no curvature.  ROM normal. No CVA tenderness. Lungs:   Clear to auscultation bilaterally. Chest wall:  No tenderness or deformity. Heart:  Regular rate and rhythm, S1, S2 normal, no murmur, click, rub or gallop. Abdomen:   Soft, non-tender. Bowel sounds normal. No masses,  No organomegaly. Extremities: Extremities normal, atraumatic, no cyanosis or edema. Skin: Skin color, texture, turgor normal. No rashes or lesions. Lymph nodes: Cervical, supraclavicular nodes normal.    Neurologic: CNII-XII intact. Diagnostic Imaging   No results found for this or any previous visit. 8/29/14 breast MRI  Right breast: A postoperative cavity appears in the right upper outer   quadrant anteriorly, containing a biopsy clip in its posterior lateral wall. The cavity itself measures 3.2 x 2.4 x 3.8 cm. There is no abnormal   associated skin or nipple thickening and no additional enhancement or   morphologic abnormality in the right breast concerning for malignancy. There   are benign appearing nonenhancing masses suggesting fibroadenomas and small   scattered foci showing delayed enhancement, most of which suggest   intramammary lymph nodes. There is no lymphadenopathy. Left breast: Scattered foci of delayed enhancement. No morphologic or   enhancement abnormality to suggest malignancy. No lymphadenopathy. IMPRESSION:   1. Right BI-RADS 6, known malignancy. Left BI-RADS 2, benign. 2. RIGHT BREAST: Postoperative changes status post surgical resection of   known malignancy. There is no MRI evidence for residual neoplasm in the   breast.   3. LEFT BREAST: No MRI sign of malignancy.     12/11/14 dexa  Findings:  Lumbar spine: L1-L4, excluding L2  Bone mineral density (gm/cm2): 1.170  % of peak bone mass: 112  % for age matched controls: 125  T score: 1.1  Z score: 2.2  Hip: Left total hip  Bone mineral density (gm/cm2): 1.090  % of peak bone mass: 116  % for age matched controls: 80  T score: 1.2  Z score: 1.9  Hip: Right total hip  Bone mineral density (gm/cm2): 1.052  % of peak bone mass: 112  % for age matched controls: 80  T score: 0.9  Z score: 1.5  IMPRESSION:  This patient is normal using the World Health Organization criteria  10 year probability of major osteoporotic fracture: 3.3%  10 year probability of hip fracture: 0%    4/23/15 UE Doppler  FINDINGS:  Right: The internal jugular, subclavian, axillary, brachial,  basilic, and cephalic veins are visualized; each is compressible and no narrowing of the flow channel on color Doppler imaging is observed. Phasic/pulsatile flow is observed in the subclavian vein. Left: The subclavian vein is patent on color Doppler imaging and phasic/pulsatile flow is observed. This side was not otherwise evaluated. IMPRESSION: No evidence of right upper extremity vein thrombosis. 8/13/15 Bilat Mammogram  FINDINGS: Bilateral digital diagnostic mammography was performed, and is interpreted in conjunction with a computer assisted detection (CAD) system. There are postoperative changes within the right breast. There is new diffuse right breast trabecular and skin thickening, likely reflecting radiation therapy. No dominant mass is visualized. No suspicious cluster of microcalcification is seen. Left breast parenchyma is stable.    1/21/16 bilat mammo  IMPRESSION:  1. Right BI-RADS 2, benign. LEFT BI-RADS 4, suspicious. 2. RIGHT BREAST: Postoperative changes in the upper outer quadrant    accompanied by new post radiation changes but no MRI sign of malignancy.    Followup diagnostic mammography is recommended in August 2016, followup    surveillance MRI January 2017. 3. LEFT BREAST: Clumped linear enhancement at 11:30, essentially new since    the prior study, concerning for possible DCIS.  Diagnostic mammography with    breast MRI radiologist is recommended, with magnification views at 11:30 ,    and sonographic correlation if appropriate for further characterization and    possible biopsy localization. If this is unsuccessful, MRI localization    could be attempted. 4. A summary portfolio has been created for reference and is available in    PACS. 3/10/16 MRI biopsy Left breast-benign    11/7/17 3D mammogram  benign      Lab Results  Lab Results   Component Value Date/Time    WBC 4.5 08/13/2014 02:52 PM    HGB 12.0 08/13/2014 02:52 PM    HCT 36.4 08/13/2014 02:52 PM    PLATELET 227 21/67/7934 02:52 PM    MCV 81.8 08/13/2014 02:52 PM     Lab Results   Component Value Date/Time    Sodium 141 08/13/2014 02:52 PM    Potassium 4.0 08/13/2014 02:52 PM    Chloride 101 08/13/2014 02:52 PM    CO2 30 08/13/2014 02:52 PM    Anion gap 10 08/13/2014 02:52 PM    Glucose 106 (H) 08/13/2014 02:52 PM    BUN 20 08/13/2014 02:52 PM    Creatinine 0.79 08/13/2014 02:52 PM    BUN/Creatinine ratio 25 (H) 08/13/2014 02:52 PM    GFR est AA >60 08/13/2014 02:52 PM    GFR est non-AA >60 08/13/2014 02:52 PM    Calcium 9.1 08/13/2014 02:52 PM     Assessment/Plan:  62 y.o. female with [id number], right IDC, gr 2, ER +, ME negative, HER 2 negative. Postmenopausal, LMP 9/2012. Intermediate oncotype. PS 0    1. Breast cancer stage: IIA    Hormonal therapy: administered    With no further therapy, her RR is 36%, treatment with endocrine therapy will decrease her RR to 18%. Patient has declined chemotherapy. No evidence of recurrence, continue anastrozole. 3D mammogram due in Nov 2018, ordered. Baseline DEXA in 2014 normal. Repeat DEXA ordered, not yet scheduled. She appears eligible for the aspirin study, will discuss with her at her next visit. 2. Right breast lymphedema: worse in the summer. Lowered sodium intake    > 15 min were spent with this patient with > 50% of that time spent in face to face counseling    There are no Patient Instructions on file for this visit. Follow-up Disposition:  Return for 6-7m claudia noriega.     Job Guajardo MD

## 2019-04-17 ENCOUNTER — TELEPHONE (OUTPATIENT)
Dept: ONCOLOGY | Age: 59
End: 2019-04-17

## 2019-04-17 NOTE — TELEPHONE ENCOUNTER
4/17/19 12:57 PM: Called patient to reschedule office appointment. Patient requested a late-afternoon appointment on May 8th. Appointment scheduled for Wednesday, 5/8/19, at 3:30 PM. Patient aware of appointment time.

## 2019-05-08 ENCOUNTER — OFFICE VISIT (OUTPATIENT)
Dept: ONCOLOGY | Age: 59
End: 2019-05-08

## 2019-05-08 VITALS
DIASTOLIC BLOOD PRESSURE: 83 MMHG | BODY MASS INDEX: 33.29 KG/M2 | WEIGHT: 199.8 LBS | TEMPERATURE: 97.2 F | HEART RATE: 82 BPM | SYSTOLIC BLOOD PRESSURE: 137 MMHG | OXYGEN SATURATION: 96 % | RESPIRATION RATE: 18 BRPM | HEIGHT: 65 IN

## 2019-05-08 DIAGNOSIS — Z78.0 POST-MENOPAUSAL: ICD-10-CM

## 2019-05-08 DIAGNOSIS — I89.0 LYMPHEDEMA: ICD-10-CM

## 2019-05-08 DIAGNOSIS — Z17.0 MALIGNANT NEOPLASM OF RIGHT BREAST IN FEMALE, ESTROGEN RECEPTOR POSITIVE, UNSPECIFIED SITE OF BREAST (HCC): Primary | ICD-10-CM

## 2019-05-08 DIAGNOSIS — C50.911 MALIGNANT NEOPLASM OF RIGHT BREAST IN FEMALE, ESTROGEN RECEPTOR POSITIVE, UNSPECIFIED SITE OF BREAST (HCC): Primary | ICD-10-CM

## 2019-05-08 NOTE — PROGRESS NOTES
[address]  [facility], [address] [place] [address] David 19  W: [phone]  F: [phone]     f/u HEME/ONC CONSULT    Reason for visit: evaluation for treatment for   Breast Cancer    Consulting physician:  Dr. Tony Earkelli    HPI:   Shira Elizalde is a 62 y.o.  female who I was asked to see in consultation at the request of Dr. Kimber Flores for evaluation for systemic therapy for breast cancer. She had an abnormal mammogram (last mammogram prior to this year was in 2008) that led to a right breast biopsy on 7/29/14 showing ADH. Right lumpectomy on 8/20/14 showed 1 cm of IDC, gr 2, ER + at 100%, GA negative, HER 2 negative (ratio 1.2, sig/cell 3.2), ki67 10%, DCIS present without extensive intraductal component, 1.2 cm, ER + at 100%, GA negative. oncotype RS = 21, intermediate risk. ER + at 9.2; GA negative at 5.3; HER 2 negative at 8.7. SLN bx and ALND on 10/6/14 showed 1/12 nodes involved, 0.4 cm, no CHRIS. Extensive FH of breast cancer, MGM at age 67, mother diagnosed at age 72, 2 maternal aunts diagnosed with breast cancer at ages 67 and 76. BRCA 1/2/BARTnegative. Finished XRT 12/12/14    Started anastrozole 12/19/14    Interval history: In today for follow up. Complains of gr 2 constipation, gr 1 swelling. Taking anastrozole every day. DX   Encounter Diagnoses   Name Primary?     Malignant neoplasm of right breast in female, estrogen receptor positive, unspecified site of breast ([address] Yes    Post-menopausal     Lymphedema           Past Medical History:   Diagnosis Date    Anemia 10 yrs ago    Breast cancer ([address]     2014    Cancer ([address] 7/30/2014    Dental disorder periodontal disease    Radiation therapy complication     idc- pt opted no chemo 1+lymph node     Past Surgical History:   Procedure Laterality Date    BREAST SURGERY PROCEDURE UNLISTED Right     RIGHT sentinel node biopsy    HX BREAST BIOPSY Right 8/20/2014    RIGHT lumpectomy    HX BREAST LUMPECTOMY Right     2014-idc    HX  SECTION  1997    HX GI  2010     Social History     Socioeconomic History    Marital status: SINGLE     Spouse name: Not on file    Number of children: Not on file    Years of education: Not on file    Highest education level: Not on file   Tobacco Use    Smoking status: Never Smoker    Smokeless tobacco: Never Used   Substance and Sexual Activity    Alcohol use: No    Drug use: No    Sexual activity: Yes     Partners: Male     Birth control/protection: Condom     Family History   Problem Relation Age of Onset    Lupus Sister     Hypertension Sister     Cancer Mother 72        breast    Stroke Mother     Breast Cancer Mother     Cancer Maternal Aunt 67        breast    Cancer Maternal Grandmother 67        breast    Cancer Maternal Aunt 75        breast       Current Outpatient Medications   Medication Sig Dispense Refill    anastrozole (ARIMIDEX) 1 mg tablet TAKE 1 TABLET BY MOUTH EVERY DAY 90 Tab 3       No Known Allergies    Review of Systems    A comprehensive review of systems was performed and all systems were negative except for HPI and for the the symptom report form, reviewed and scanned in.    Objective:  Physical Exam:  Visit Vitals  /83   Pulse 82   Temp 97.2 °F (36.2 °C) (Temporal)   Resp 18   Ht 5' 5\" (1.651 m)   Wt 199 lb 12.8 oz (90.6 kg)   SpO2 96%   BMI 33.25 kg/m²       General:  Alert, cooperative, no distress, appears stated age. Head:  Normocephalic, without obvious abnormality, atraumatic. Eyes:  Conjunctivae/corneas clear. PERRL, EOMs intact. Throat: Lips, mucosa, and tongue normal.    Neck: Supple, symmetrical, trachea midline, no adenopathy, thyroid: no enlargement/tenderness/nodules   Back:   Symmetric, no curvature. ROM normal. No CVA tenderness. Lungs:   Clear to auscultation bilaterally. Chest wall:  No tenderness or deformity.    Heart:  Regular rate and rhythm, S1, S2 normal, no murmur, click, rub or gallop. Abdomen:   Soft, non-tender. Bowel sounds normal.     Extremities: Extremities normal, atraumatic, no cyanosis or edema. Skin: Skin color, texture, turgor normal. No rashes or lesions. Lymph nodes: Cervical, supraclavicular nodes normal.    Neurologic: CNII-XII intact. Diagnostic Imaging   No results found for this or any previous visit. 8/29/14 breast MRI  Right breast: A postoperative cavity appears in the right upper outer   quadrant anteriorly, containing a biopsy clip in its posterior lateral wall. The cavity itself measures 3.2 x 2.4 x 3.8 cm. There is no abnormal   associated skin or nipple thickening and no additional enhancement or   morphologic abnormality in the right breast concerning for malignancy. There   are benign appearing nonenhancing masses suggesting fibroadenomas and small   scattered foci showing delayed enhancement, most of which suggest   intramammary lymph nodes. There is no lymphadenopathy. Left breast: Scattered foci of delayed enhancement. No morphologic or   enhancement abnormality to suggest malignancy. No lymphadenopathy. IMPRESSION:   1. Right BI-RADS 6, known malignancy. Left BI-RADS 2, benign. 2. RIGHT BREAST: Postoperative changes status post surgical resection of   known malignancy. There is no MRI evidence for residual neoplasm in the   breast.   3. LEFT BREAST: No MRI sign of malignancy.     12/11/14 dexa  Findings:  Lumbar spine: L1-L4, excluding L2  Bone mineral density (gm/cm2): 1.170  % of peak bone mass: 112  % for age matched controls: 125  T score: 1.1  Z score: 2.2  Hip: Left total hip  Bone mineral density (gm/cm2): 1.090  % of peak bone mass: 116  % for age matched controls: 80  T score: 1.2  Z score: 1.9  Hip: Right total hip  Bone mineral density (gm/cm2): 1.052  % of peak bone mass: 112  % for age matched controls: 80  T score: 0.9  Z score: 1.5  IMPRESSION:  This patient is normal using the World Health Organization criteria  10 year probability of major osteoporotic fracture: 3.3%  10 year probability of hip fracture: 0%    4/23/15 UE Doppler  FINDINGS:  Right: The internal jugular, subclavian, axillary, brachial,  basilic, and cephalic veins are visualized; each is compressible and no narrowing of the flow channel on color Doppler imaging is observed. Phasic/pulsatile flow is observed in the subclavian vein. Left: The subclavian vein is patent on color Doppler imaging and phasic/pulsatile flow is observed. This side was not otherwise evaluated. IMPRESSION: No evidence of right upper extremity vein thrombosis. 8/13/15 Bilat Mammogram  FINDINGS: Bilateral digital diagnostic mammography was performed, and is interpreted in conjunction with a computer assisted detection (CAD) system. There are postoperative changes within the right breast. There is new diffuse right breast trabecular and skin thickening, likely reflecting radiation therapy. No dominant mass is visualized. No suspicious cluster of microcalcification is seen. Left breast parenchyma is stable.    1/21/16 bilat mammo  IMPRESSION:  1. Right BI-RADS 2, benign. LEFT BI-RADS 4, suspicious. 2. RIGHT BREAST: Postoperative changes in the upper outer quadrant    accompanied by new post radiation changes but no MRI sign of malignancy.    Followup diagnostic mammography is recommended in August 2016, followup    surveillance MRI January 2017. 3. LEFT BREAST: Clumped linear enhancement at 11:30, essentially new since    the prior study, concerning for possible DCIS. Diagnostic mammography with    breast MRI radiologist is recommended, with magnification views at 11:30 ,    and sonographic correlation if appropriate for further characterization and    possible biopsy localization. If this is unsuccessful, MRI localization    could be attempted. 4. A summary portfolio has been created for reference and is available in    PACS.     3/10/16 MRI biopsy Left breast-benign    11/7/17 3D mammogram  benign      Lab Results  Lab Results   Component Value Date/Time    WBC 4.5 08/13/2014 02:52 PM    HGB 12.0 08/13/2014 02:52 PM    HCT 36.4 08/13/2014 02:52 PM    PLATELET 142 98/61/6482 02:52 PM    MCV 81.8 08/13/2014 02:52 PM     Lab Results   Component Value Date/Time    Sodium 141 08/13/2014 02:52 PM    Potassium 4.0 08/13/2014 02:52 PM    Chloride 101 08/13/2014 02:52 PM    CO2 30 08/13/2014 02:52 PM    Anion gap 10 08/13/2014 02:52 PM    Glucose 106 (H) 08/13/2014 02:52 PM    BUN 20 08/13/2014 02:52 PM    Creatinine 0.79 08/13/2014 02:52 PM    BUN/Creatinine ratio 25 (H) 08/13/2014 02:52 PM    GFR est AA >60 08/13/2014 02:52 PM    GFR est non-AA >60 08/13/2014 02:52 PM    Calcium 9.1 08/13/2014 02:52 PM     Assessment/Plan:  62 y.o. female with [id number], right IDC, gr 2, ER +, NH negative, HER 2 negative. Postmenopausal, LMP 9/2012. Intermediate oncotype. PS 0    1. Breast cancer stage: IIA    Hormonal therapy: administered    With no further therapy, her RR is 36%, treatment with endocrine therapy will decrease her RR to 18%. Patient has declined chemotherapy. No evidence of recurrence, continue anastrozole, discussed with her 5 yr vs 10 yr risk vs benefits. She is agreeable. Will continue anastrozole for now. 3D mammogram due in Nov 2018, ordered previously, but has not scheduled. She does not wish to do 3D mammogram, regular mammogram ordered. Baseline DEXA in 2014 normal. Repeat DEXA ordered, not yet scheduled. Encouraged to schedule this. She appears eligible for the aspirin study, discussed this today, she would like information, will have research meet with her today. Declines breast exam today. 2. Right breast lymphedema: worse in the summer.  Lowered sodium intake    > 15 min were spent with this patient with > 50% of that time spent in face to face counseling    This patient was seen in conjunction with Conchis Hernandez NP      There are no Patient Instructions on file for this visit. Follow-up and Dispositions    · Return for 6-7 mo fu, Dian Rubio.          Lexi Nair MD

## 2019-05-09 ENCOUNTER — RESEARCH ENCOUNTER (OUTPATIENT)
Dept: ONCOLOGY | Age: 59
End: 2019-05-09

## 2019-05-09 NOTE — PROGRESS NOTES
5/8/19- Met wit patient to give her a read only consent for [id number]. Briefly discussed study with patient and answered all immediate questions. Patient states she will either call or let me know at next office visit what her decision is.

## 2019-08-09 ENCOUNTER — HOSPITAL ENCOUNTER (OUTPATIENT)
Dept: MAMMOGRAPHY | Age: 59
Discharge: HOME | End: 2019-08-09
Attending: NURSE PRACTITIONER
Payer: COMMERCIAL

## 2019-08-09 DIAGNOSIS — Z17.0 MALIGNANT NEOPLASM OF RIGHT BREAST IN FEMALE, ESTROGEN RECEPTOR POSITIVE, UNSPECIFIED SITE OF BREAST (HCC): ICD-10-CM

## 2019-08-09 DIAGNOSIS — C50.911 MALIGNANT NEOPLASM OF RIGHT BREAST IN FEMALE, ESTROGEN RECEPTOR POSITIVE, UNSPECIFIED SITE OF BREAST (HCC): ICD-10-CM

## 2019-08-09 PROCEDURE — 77066 DX MAMMO INCL CAD BI: CPT

## 2019-12-04 ENCOUNTER — OFFICE VISIT (OUTPATIENT)
Dept: ONCOLOGY | Age: 59
End: 2019-12-04

## 2019-12-04 VITALS
HEIGHT: 65 IN | TEMPERATURE: 98.2 F | SYSTOLIC BLOOD PRESSURE: 133 MMHG | RESPIRATION RATE: 18 BRPM | DIASTOLIC BLOOD PRESSURE: 82 MMHG | OXYGEN SATURATION: 98 % | BODY MASS INDEX: 33.49 KG/M2 | WEIGHT: 201 LBS | HEART RATE: 73 BPM

## 2019-12-04 DIAGNOSIS — C50.911 MALIGNANT NEOPLASM OF RIGHT BREAST IN FEMALE, ESTROGEN RECEPTOR POSITIVE, UNSPECIFIED SITE OF BREAST (HCC): Primary | ICD-10-CM

## 2019-12-04 DIAGNOSIS — I89.0 LYMPHEDEMA: ICD-10-CM

## 2019-12-04 DIAGNOSIS — Z17.0 MALIGNANT NEOPLASM OF RIGHT BREAST IN FEMALE, ESTROGEN RECEPTOR POSITIVE, UNSPECIFIED SITE OF BREAST (HCC): Primary | ICD-10-CM

## 2019-12-04 DIAGNOSIS — Z78.0 POST-MENOPAUSAL: ICD-10-CM

## 2019-12-04 NOTE — PROGRESS NOTES
[address], [address] [place] [address]  Jyoti Viverosngagustina [address]  W: [phone]  F: [phone]     f/u HEME/ONC CONSULT    Reason for visit: evaluation for treatment for   No chief complaint on file. Consulting physician:  Dr. Vin Cardona    HPI:   Mc Gutierrez is a 61 y.o.  female who I was asked to see in consultation at the request of Dr. Serina Neves for evaluation for systemic therapy for breast cancer. She had an abnormal mammogram (last mammogram prior to this year was in 2008) that led to a right breast biopsy on 7/29/14 showing ADH. Right lumpectomy on 8/20/14 showed 1 cm of IDC, gr 2, ER + at 100%, VA negative, HER 2 negative (ratio 1.2, sig/cell 3.2), ki67 10%, DCIS present without extensive intraductal component, 1.2 cm, ER + at 100%, VA negative. oncotype RS = 21, intermediate risk. ER + at 9.2; VA negative at 5.3; HER 2 negative at 8.7. SLN bx and ALND on 10/6/14 showed 1/12 nodes involved, 0.4 cm, no CHRIS. Extensive FH of breast cancer, MGM at age 67, mother diagnosed at age 72, 2 maternal aunts diagnosed with breast cancer at ages 67 and 76. BRCA 1/2/BARTnegative. Finished XRT 12/12/14    Started anastrozole 12/19/14    Interval history: In today for follow up. Complains of gr 1 constipation, gr 1 insomnia, gr 1 vaginal dryness. DX   Encounter Diagnoses   Name Primary?     Malignant neoplasm of right breast in female, estrogen receptor positive, unspecified site of breast ([address] Yes    Post-menopausal     Lymphedema           Past Medical History:   Diagnosis Date    Anemia 10 yrs ago    Breast cancer ([address]     2014    Cancer ([address] 7/30/2014    Dental disorder periodontal disease    Radiation therapy complication     idc- pt opted no chemo 1+lymph node     Past Surgical History:   Procedure Laterality Date    BREAST SURGERY PROCEDURE UNLISTED Right     RIGHT sentinel node biopsy    HX BREAST BIOPSY Right 8/20/2014    RIGHT lumpectomy    HX BREAST LUMPECTOMY Right     2014-idc    HX  SECTION  1997    HX GI  2010     Social History     Socioeconomic History    Marital status: SINGLE     Spouse name: Not on file    Number of children: Not on file    Years of education: Not on file    Highest education level: Not on file   Tobacco Use    Smoking status: Never Smoker    Smokeless tobacco: Never Used   Substance and Sexual Activity    Alcohol use: No    Drug use: No    Sexual activity: Yes     Partners: Male     Birth control/protection: Condom     Family History   Problem Relation Age of Onset    Lupus Sister     Hypertension Sister     Cancer Mother 72        breast    Stroke Mother     Breast Cancer Mother         72?  Cancer Maternal Aunt 72        breast    Cancer Maternal Grandmother 72        breast    Cancer Maternal Aunt 75        breast       Current Outpatient Medications   Medication Sig Dispense Refill    anastrozole (ARIMIDEX) 1 mg tablet TAKE 1 TABLET BY MOUTH EVERY DAY 90 Tab 3       No Known Allergies    Review of Systems    A comprehensive review of systems was performed and all systems were negative except for HPI and for the the symptom report form, reviewed and scanned in.    Objective:  Physical Exam:  There were no vitals taken for this visit. General:  Alert, cooperative, no distress, appears stated age. Head:  Normocephalic, without obvious abnormality, atraumatic. Eyes:  Conjunctivae/corneas clear. PERRL, EOMs intact. Throat: Lips, mucosa, and tongue normal.    Neck: Supple, symmetrical, trachea midline, no adenopathy, thyroid: no enlargement/tenderness/nodules   Back:   Symmetric, no curvature. ROM normal. No CVA tenderness. Lungs:   Clear to auscultation bilaterally. Chest wall:  No tenderness or deformity. Heart:  Regular rate and rhythm, S1, S2 normal, no murmur, click, rub or gallop. Abdomen:   Soft, non-tender.  Bowel sounds normal.     Extremities: Extremities normal, atraumatic, no cyanosis or edema. Skin: Skin color, texture, turgor normal. No rashes or lesions. Lymph nodes: Cervical, supraclavicular nodes normal.    Neurologic: CNII-XII intact. Diagnostic Imaging   No results found for this or any previous visit. 8/29/14 breast MRI  Right breast: A postoperative cavity appears in the right upper outer   quadrant anteriorly, containing a biopsy clip in its posterior lateral wall. The cavity itself measures 3.2 x 2.4 x 3.8 cm. There is no abnormal   associated skin or nipple thickening and no additional enhancement or   morphologic abnormality in the right breast concerning for malignancy. There   are benign appearing nonenhancing masses suggesting fibroadenomas and small   scattered foci showing delayed enhancement, most of which suggest   intramammary lymph nodes. There is no lymphadenopathy. Left breast: Scattered foci of delayed enhancement. No morphologic or   enhancement abnormality to suggest malignancy. No lymphadenopathy. IMPRESSION:   1. Right BI-RADS 6, known malignancy. Left BI-RADS 2, benign. 2. RIGHT BREAST: Postoperative changes status post surgical resection of   known malignancy. There is no MRI evidence for residual neoplasm in the   breast.   3. LEFT BREAST: No MRI sign of malignancy.     12/11/14 dexa  Findings:  Lumbar spine: L1-L4, excluding L2  Bone mineral density (gm/cm2): 1.170  % of peak bone mass: 112  % for age matched controls: 125  T score: 1.1  Z score: 2.2  Hip: Left total hip  Bone mineral density (gm/cm2): 1.090  % of peak bone mass: 116  % for age matched controls: 80  T score: 1.2  Z score: 1.9  Hip: Right total hip  Bone mineral density (gm/cm2): 1.052  % of peak bone mass: 112  % for age matched controls: 80  T score: 0.9  Z score: 1.5  IMPRESSION:  This patient is normal using the World Health Organization criteria  10 year probability of major osteoporotic fracture: 3.3%  10 year probability of hip fracture: 0%    4/23/15 UE Doppler  FINDINGS:  Right: The internal jugular, subclavian, axillary, brachial,  basilic, and cephalic veins are visualized; each is compressible and no narrowing of the flow channel on color Doppler imaging is observed. Phasic/pulsatile flow is observed in the subclavian vein. Left: The subclavian vein is patent on color Doppler imaging and phasic/pulsatile flow is observed. This side was not otherwise evaluated. IMPRESSION: No evidence of right upper extremity vein thrombosis. 8/13/15 Bilat Mammogram  FINDINGS: Bilateral digital diagnostic mammography was performed, and is interpreted in conjunction with a computer assisted detection (CAD) system. There are postoperative changes within the right breast. There is new diffuse right breast trabecular and skin thickening, likely reflecting radiation therapy. No dominant mass is visualized. No suspicious cluster of microcalcification is seen. Left breast parenchyma is stable.    1/21/16 bilat mammo  IMPRESSION:  1. Right BI-RADS 2, benign. LEFT BI-RADS 4, suspicious. 2. RIGHT BREAST: Postoperative changes in the upper outer quadrant    accompanied by new post radiation changes but no MRI sign of malignancy.    Followup diagnostic mammography is recommended in August 2016, followup    surveillance MRI January 2017. 3. LEFT BREAST: Clumped linear enhancement at 11:30, essentially new since    the prior study, concerning for possible DCIS. Diagnostic mammography with    breast MRI radiologist is recommended, with magnification views at 11:30 ,    and sonographic correlation if appropriate for further characterization and    possible biopsy localization. If this is unsuccessful, MRI localization    could be attempted. 4. A summary portfolio has been created for reference and is available in    PACS.     3/10/16 MRI biopsy Left breast-benign    11/7/17 3D mammogram  benign      Lab Results  Lab Results   Component Value Date/Time    WBC 4.5 08/13/2014 02:52 PM    HGB 12.0 08/13/2014 02:52 PM    HCT 36.4 08/13/2014 02:52 PM    PLATELET 110 48/02/2174 02:52 PM    MCV 81.8 08/13/2014 02:52 PM     Lab Results   Component Value Date/Time    Sodium 141 08/13/2014 02:52 PM    Potassium 4.0 08/13/2014 02:52 PM    Chloride 101 08/13/2014 02:52 PM    CO2 30 08/13/2014 02:52 PM    Anion gap 10 08/13/2014 02:52 PM    Glucose 106 (H) 08/13/2014 02:52 PM    BUN 20 08/13/2014 02:52 PM    Creatinine 0.79 08/13/2014 02:52 PM    BUN/Creatinine ratio 25 (H) 08/13/2014 02:52 PM    GFR est AA >60 08/13/2014 02:52 PM    GFR est non-AA >60 08/13/2014 02:52 PM    Calcium 9.1 08/13/2014 02:52 PM     Assessment/Plan:  61 y.o. female with [id number], right IDC, gr 2, ER +, ID negative, HER 2 negative. Postmenopausal, LMP 9/2012. Intermediate oncotype. PS 0    1. Breast cancer stage: IIA    Hormonal therapy: administered    With no further therapy, her RR is 36%, treatment with endocrine therapy will decrease her RR to 18%. Patient has declined chemotherapy. No evidence of recurrence, continue anastrozole, discussed with her 5 yr vs 10 yr risk vs benefits. As of now, she will like to stop after 12/2019, however will call around 2/2020 and let us know if she wants to continue endocrine therapy. We will see her back in 1 year. 3D mammogram in 8/2019 benign, next due 8/2020. Baseline DEXA in 2014 normal. Repeat DEXA ordered previously, however patient declined. She appears eligible for the aspirin study, previously met with research, declines at this time. Declines breast exam today. 2. Right breast lymphedema: worse in the summer. Lowered sodium intake    > 15 min were spent with this patient with > 50% of that time spent in face to face counseling    This patient was seen in conjunction with Vani Mcginnis NP      There are no Patient Instructions on file for this visit.    Follow-up and Dispositions    · Return in about 1 year (around 12/4/2020) for 1 year hari, Kaylin Tse. Denise Powell MD